# Patient Record
(demographics unavailable — no encounter records)

---

## 2024-12-17 NOTE — HISTORY OF PRESENT ILLNESS
[de-identified] : Patient is here for evaluation of right shoulder pain Affecting quality of life Wakes up at night due to pain  painj with activity, getting worse  been going on since 2018, getting worse  NAD Right shoulder: TTP ant GH joint, bicipital groove FF 0-175 ER 60 IR T12 4/5 strength scapular abduction, ER, IR Pos Impingement Pos Oscar Pos Cross Arm Adduction Negative instability  XRay right shoulder negative for fracture, dislocation, arthritis  Plan went over findings explained the imaging MRI ordered w/o contrast  needed to evaluate RC Patient will start PT as well  Follow up after MRI is done mobic sewnt for pain

## 2025-01-28 NOTE — HISTORY OF PRESENT ILLNESS
[de-identified] : Patient is here for evaluation of right shoulder pain Affecting quality of life Wakes up at night due to pain  painj with activity, getting worse  been going on since 2018, getting worse  NAD Right shoulder: TTP ant GH joint, bicipital groove FF 0-175 ER 60 IR T12 4/5 strength scapular abduction, ER, IR Pos Impingement Pos Oscar Pos Cross Arm Adduction Negative instability  XRay right shoulder negative for fracture, dislocation, arthritis  mri right shoulder high grade pRCT  Plan went over findings explained the imaging explained mri and op vs nonop explained will proceed with surgery right shoulder arthroscopy, rotator cuff repair, decompression, distal clavicle excision, possible open biceps tenodesis  Operative and nonoperative options discussed with patient. Surgical risks, benefits, and alternatives explained. Surgical risks include but are not exclusive to bleeding, infection, neurovascular damage, continued pain, stiffness, scarring, rsd, dvt/pe, potential failure of surgery that may require further surgery in the future. I went over incisions and rehabilitation. All questions answered.

## 2025-02-27 NOTE — HISTORY OF PRESENT ILLNESS
[de-identified] : Pt is s/p right shoulder arthroscopy Doing well. Pain controlled  NAD Right shoulder Incisions healed Mild diffuse TTP Compartments soft and NT ROM limited secondary to pain NVI  s/p right shoulder arthroscopy went over the surgery in detail will start pt, protocol provided continue pain control as needed fu in 1 month all questions answered

## 2025-04-18 NOTE — DISCUSSION/SUMMARY
[de-identified] : Postop visit #2  HPI Patient is a 38-year-old male who reports to the office for subsequent reevaluation of his right shoulder.  This is his second postoperative visit.  He is status post right shoulder arthroscopy/rotator cuff repair done on 2/19/2025 by Dr. Ling.  He has been undergoing formal physical therapy which has been helping with his range of motion.  He is right-hand dominant.  Lifting, certain range of motion aggravate the patient's pain at times.  Denies any numbness or tingling.  Right shoulder exam is as follows: No swelling noted.  No erythema or ecchymosis.  Well-healed incision sites.  Active forward flexion/abduction to approximately 90 degrees with stiffness and pain.  Passive forward flexion/abduction to approximate 120 degrees with stiffness and pain.  Internal rotation to L5 and external rotation to 80 degrees with stiffness and pain.  Strength is 4/5 with discomfort.  Negative speeds, Jobes, and Aiken's testing.  Light touch intact throughout.  Neurovascular intact.  Assessment/plan Patient is doing well status post that surgery.  His range of motion has been improving.  New PT prescription was provided so he may continue that as directed to regain his motion and strength.  OTC NSAIDs as needed for pain.  The patient was advised to rest/ice the area and may alternate with warm compresses as needed.  Follow-up in 2 months.  May consider injections at next visit if still symptomatic.  All question/concerns were answered in detail.

## 2025-07-12 NOTE — HISTORY OF PRESENT ILLNESS
[de-identified] : Pt is s/p right shoulder arthroscopy, RC repair Doing well. Pain controlled  NAD Right shoulder Incisions healed Mild diffuse TTP Compartments soft and NT near FROM Improved strength NVI  s/p right shoulder arthroscopy went over the surgery in detail cont pt and progress as tolerated new pt script